# Patient Record
Sex: FEMALE | Race: WHITE | NOT HISPANIC OR LATINO
[De-identification: names, ages, dates, MRNs, and addresses within clinical notes are randomized per-mention and may not be internally consistent; named-entity substitution may affect disease eponyms.]

---

## 2017-04-07 ENCOUNTER — APPOINTMENT (OUTPATIENT)
Dept: OTOLARYNGOLOGY | Facility: CLINIC | Age: 27
End: 2017-04-07

## 2017-04-07 VITALS
OXYGEN SATURATION: 100 % | TEMPERATURE: 98.2 F | HEART RATE: 73 BPM | SYSTOLIC BLOOD PRESSURE: 105 MMHG | DIASTOLIC BLOOD PRESSURE: 67 MMHG

## 2017-04-07 VITALS — HEIGHT: 65 IN | WEIGHT: 140 LBS | BODY MASS INDEX: 23.32 KG/M2

## 2017-04-07 DIAGNOSIS — J31.1 CHRONIC NASOPHARYNGITIS: ICD-10-CM

## 2017-04-07 DIAGNOSIS — H92.02 OTALGIA, LEFT EAR: ICD-10-CM

## 2017-04-07 RX ORDER — CEFUROXIME AXETIL 500 MG/1
500 TABLET ORAL
Qty: 14 | Refills: 0 | Status: ACTIVE | COMMUNITY
Start: 2017-04-07 | End: 1900-01-01

## 2017-04-07 RX ORDER — FLUTICASONE PROPIONATE 50 UG/1
50 SPRAY, METERED NASAL DAILY
Qty: 1 | Refills: 2 | Status: ACTIVE | COMMUNITY
Start: 2017-04-07 | End: 1900-01-01

## 2017-04-21 ENCOUNTER — APPOINTMENT (OUTPATIENT)
Dept: OTOLARYNGOLOGY | Facility: CLINIC | Age: 27
End: 2017-04-21

## 2017-10-06 ENCOUNTER — TRANSCRIPTION ENCOUNTER (OUTPATIENT)
Age: 27
End: 2017-10-06

## 2022-11-22 ENCOUNTER — APPOINTMENT (OUTPATIENT)
Dept: OBGYN | Facility: CLINIC | Age: 32
End: 2022-11-22

## 2022-11-22 VITALS
SYSTOLIC BLOOD PRESSURE: 105 MMHG | WEIGHT: 117 LBS | BODY MASS INDEX: 19.49 KG/M2 | HEIGHT: 65 IN | DIASTOLIC BLOOD PRESSURE: 66 MMHG

## 2022-11-22 DIAGNOSIS — Z11.51 ENCOUNTER FOR SCREENING FOR HUMAN PAPILLOMAVIRUS (HPV): ICD-10-CM

## 2022-11-22 PROCEDURE — 99385 PREV VISIT NEW AGE 18-39: CPT

## 2022-11-22 RX ORDER — NORETHINDRONE ACETATE AND ETHINYL ESTRADIOL 1.5-30(21)
1.5-3 KIT ORAL
Qty: 28 | Refills: 0 | Status: ACTIVE | COMMUNITY
Start: 2022-10-11

## 2022-11-22 NOTE — END OF VISIT
[FreeTextEntry3] : I, Monique Myrick, acted as a scribe on behalf of Dr. Rebekah Shaw on 11/22/2022. \par \par All medical entries made by the scribe where at my, Dr. Rebekah Shaw, direction and personally dictated by me on 11/22/2022. I have reviewed the chart and agree that the record accurately reflects my personal performance of the history, physical exam, assessment, and plan. I have also personally directed, reviewed and agreed with the chart.\par

## 2022-11-22 NOTE — HISTORY OF PRESENT ILLNESS
[FreeTextEntry1] : 31 yo  LMP 22, she is a new pt and presents for annual exam, to establish care, last Gyn exam 2021. She was on Blisovi Fe . OCP. She stopped taking her OCP 10/1/22 due to delay in refills.  Pt was put on OCPs years ago for management of oligomenorrhea. She is currently sexually active with one long term partjner and has concerns about long-term OCP use. She has been tolerating her OCP well with no issues or side effx. 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

## 2022-11-22 NOTE — PLAN
[FreeTextEntry1] : 31 yo  LMP 22, annual exam.\par \par HCM\par -refill given for Blisovi Fe .\par -pap, HPV done today\par -pt counseled regarding long-term OCP use, she plans to continue \par -vit D/exercise/calcium\par -f/u PCP for annual and appropriate immunizations\par -rto 1 year for annual.

## 2022-11-23 LAB — HPV HIGH+LOW RISK DNA PNL CVX: NOT DETECTED

## 2022-11-26 ENCOUNTER — TRANSCRIPTION ENCOUNTER (OUTPATIENT)
Age: 32
End: 2022-11-26

## 2022-12-04 ENCOUNTER — TRANSCRIPTION ENCOUNTER (OUTPATIENT)
Age: 32
End: 2022-12-04

## 2022-12-04 LAB — CYTOLOGY CVX/VAG DOC THIN PREP: NORMAL

## 2023-02-01 ENCOUNTER — TRANSCRIPTION ENCOUNTER (OUTPATIENT)
Age: 33
End: 2023-02-01

## 2023-11-28 ENCOUNTER — APPOINTMENT (OUTPATIENT)
Dept: OBGYN | Facility: CLINIC | Age: 33
End: 2023-11-28
Payer: COMMERCIAL

## 2023-11-28 VITALS
HEIGHT: 65 IN | OXYGEN SATURATION: 99 % | WEIGHT: 125 LBS | SYSTOLIC BLOOD PRESSURE: 112 MMHG | DIASTOLIC BLOOD PRESSURE: 68 MMHG | BODY MASS INDEX: 20.83 KG/M2 | HEART RATE: 82 BPM

## 2023-11-28 DIAGNOSIS — Z01.419 ENCOUNTER FOR GYNECOLOGICAL EXAMINATION (GENERAL) (ROUTINE) W/OUT ABNORMAL FINDINGS: ICD-10-CM

## 2023-11-28 PROCEDURE — 99395 PREV VISIT EST AGE 18-39: CPT

## 2024-02-09 ENCOUNTER — RX RENEWAL (OUTPATIENT)
Age: 34
End: 2024-02-09

## 2024-02-09 RX ORDER — NORETHINDRONE ACETATE AND ETHINYL ESTRADIOL 1.5-30(21)
1.5-3 KIT ORAL DAILY
Qty: 84 | Refills: 2 | Status: ACTIVE | COMMUNITY
Start: 2022-11-22 | End: 1900-01-01

## 2024-03-28 ENCOUNTER — APPOINTMENT (OUTPATIENT)
Dept: OTOLARYNGOLOGY | Facility: CLINIC | Age: 34
End: 2024-03-28
Payer: COMMERCIAL

## 2024-03-28 VITALS
HEART RATE: 93 BPM | WEIGHT: 122 LBS | TEMPERATURE: 96.9 F | BODY MASS INDEX: 20.33 KG/M2 | SYSTOLIC BLOOD PRESSURE: 130 MMHG | DIASTOLIC BLOOD PRESSURE: 73 MMHG | HEIGHT: 65 IN

## 2024-03-28 PROCEDURE — 99203 OFFICE O/P NEW LOW 30 MIN: CPT | Mod: 25

## 2024-03-28 PROCEDURE — 31231 NASAL ENDOSCOPY DX: CPT

## 2024-03-28 RX ORDER — METHYLPREDNISOLONE 4 MG/1
4 TABLET ORAL
Qty: 1 | Refills: 0 | Status: ACTIVE | COMMUNITY
Start: 2024-03-28 | End: 1900-01-01

## 2024-03-28 RX ORDER — AMOXICILLIN AND CLAVULANATE POTASSIUM 875; 125 MG/1; MG/1
875-125 TABLET, COATED ORAL
Qty: 28 | Refills: 0 | Status: ACTIVE | COMMUNITY
Start: 2024-03-28 | End: 1900-01-01

## 2024-03-28 NOTE — ASSESSMENT
[FreeTextEntry1] : 33F here for initial evaluation. Over the past 2-3 weeks, she has had constellation of persistent sx which are not improving, including nasal congestion, facial pressure, sore throat, upper tooth pain. These sx are all worse on the right side. During this time she has been taking NSAIDs and claritin-D with only temporary improvement. This has never happened before. On exam, nasal endoscopy shows right septal deviation; the right nasal cavity has marked mucosal edema with polypoid obstruction of the right middle meatus w thick murky drainage. She has a right sinusitis, which given history is likely acute. The left side is normal on exam. Will start augmentin/medrol w flonase. RTO 3 weeks.

## 2024-03-28 NOTE — HISTORY OF PRESENT ILLNESS
[de-identified] : 33F here for initial evaluation.  Over the past 2-3 weeks, she has had constellation of persistent sx which are not improving, including nasal congestion, facial pressure, sore throat, upper tooth pain. During this time she has been taking NSAIDs and claritin-D with only temporary improvement. This has never happened before.  ROS otherwise unremarkable.  ROS otherwise unremarkable.

## 2024-03-28 NOTE — PROCEDURE
[FreeTextEntry3] : Nasal Endoscopy: right septal deviation right nasal cavity w marked mucosal edema and polypoid obstruction of right middle meatus w thick murky drainage left nasal cavity clear, middle meatus grossly clear

## 2024-03-29 ENCOUNTER — APPOINTMENT (OUTPATIENT)
Dept: OTOLARYNGOLOGY | Facility: CLINIC | Age: 34
End: 2024-03-29

## 2024-04-16 ENCOUNTER — APPOINTMENT (OUTPATIENT)
Dept: OTOLARYNGOLOGY | Facility: CLINIC | Age: 34
End: 2024-04-16
Payer: COMMERCIAL

## 2024-04-16 DIAGNOSIS — J01.90 ACUTE SINUSITIS, UNSPECIFIED: ICD-10-CM

## 2024-04-16 PROCEDURE — 99213 OFFICE O/P EST LOW 20 MIN: CPT | Mod: 25

## 2024-04-16 PROCEDURE — 31231 NASAL ENDOSCOPY DX: CPT

## 2024-04-16 NOTE — PROCEDURE
[FreeTextEntry3] : Nasal Endoscopy: right septal deviation nasal airways patent middle meati patent, no mucopus or polyps choana clear

## 2024-04-16 NOTE — HISTORY OF PRESENT ILLNESS
[de-identified] : 33F here in followup.  Since last seen 3 weeks ago, she is much better. She finished augmentin/medrol and sx have resolved. At prior visit seen for acute right sinusitis in setting of nasal congestion, facial pressure, sore throat, upper tooth pain.  ROS otherwise unremarkable.

## 2024-04-16 NOTE — ASSESSMENT
[FreeTextEntry1] : 33F here in followup. Since last seen 3 weeks ago, she is much better. She finished augmentin/medrol and sx have resolved. At prior visit seen for acute right sinusitis in setting of nasal congestion, facial pressure, sore throat, upper tooth pain. Head and neck exam, including nasal endoscopy, is unremarkable w patent middle meati and nasal cavities. Resolved acute right sinusitis. Exam normal. RTO as needed.

## 2024-05-01 ENCOUNTER — OUTPATIENT (OUTPATIENT)
Dept: OUTPATIENT SERVICES | Facility: HOSPITAL | Age: 34
LOS: 1 days | End: 2024-05-01
Payer: COMMERCIAL

## 2024-05-01 ENCOUNTER — RESULT REVIEW (OUTPATIENT)
Age: 34
End: 2024-05-01

## 2024-05-01 DIAGNOSIS — R10.32 LEFT LOWER QUADRANT PAIN: ICD-10-CM

## 2024-05-01 PROCEDURE — 76705 ECHO EXAM OF ABDOMEN: CPT

## 2024-05-01 PROCEDURE — 76705 ECHO EXAM OF ABDOMEN: CPT | Mod: 26

## 2024-08-02 ENCOUNTER — NON-APPOINTMENT (OUTPATIENT)
Age: 34
End: 2024-08-02

## 2024-08-02 ENCOUNTER — APPOINTMENT (OUTPATIENT)
Dept: OPHTHALMOLOGY | Facility: CLINIC | Age: 34
End: 2024-08-02
Payer: COMMERCIAL

## 2024-08-02 PROCEDURE — 92002 INTRM OPH EXAM NEW PATIENT: CPT

## 2024-09-17 ENCOUNTER — APPOINTMENT (OUTPATIENT)
Dept: OTOLARYNGOLOGY | Facility: CLINIC | Age: 34
End: 2024-09-17

## 2024-09-20 ENCOUNTER — APPOINTMENT (OUTPATIENT)
Dept: OTOLARYNGOLOGY | Facility: CLINIC | Age: 34
End: 2024-09-20

## 2025-01-06 ENCOUNTER — APPOINTMENT (OUTPATIENT)
Dept: OBGYN | Facility: CLINIC | Age: 35
End: 2025-01-06
Payer: COMMERCIAL

## 2025-01-06 VITALS
DIASTOLIC BLOOD PRESSURE: 65 MMHG | SYSTOLIC BLOOD PRESSURE: 111 MMHG | WEIGHT: 129 LBS | BODY MASS INDEX: 21.23 KG/M2 | HEIGHT: 65.5 IN

## 2025-01-06 DIAGNOSIS — Z01.419 ENCOUNTER FOR GYNECOLOGICAL EXAMINATION (GENERAL) (ROUTINE) W/OUT ABNORMAL FINDINGS: ICD-10-CM

## 2025-01-06 PROCEDURE — 99395 PREV VISIT EST AGE 18-39: CPT

## 2025-01-07 LAB — HPV HIGH+LOW RISK DNA PNL CVX: NOT DETECTED

## 2025-01-08 ENCOUNTER — TRANSCRIPTION ENCOUNTER (OUTPATIENT)
Age: 35
End: 2025-01-08

## 2025-01-08 LAB — CYTOLOGY CVX/VAG DOC THIN PREP: NORMAL

## 2025-02-10 ENCOUNTER — TRANSCRIPTION ENCOUNTER (OUTPATIENT)
Age: 35
End: 2025-02-10